# Patient Record
Sex: FEMALE | Race: WHITE | NOT HISPANIC OR LATINO | URBAN - METROPOLITAN AREA
[De-identification: names, ages, dates, MRNs, and addresses within clinical notes are randomized per-mention and may not be internally consistent; named-entity substitution may affect disease eponyms.]

---

## 2017-02-07 ENCOUNTER — APPOINTMENT (RX ONLY)
Dept: URBAN - METROPOLITAN AREA CLINIC 78 | Facility: CLINIC | Age: 62
Setting detail: DERMATOLOGY
End: 2017-02-07

## 2017-02-07 VITALS — HEIGHT: 63 IN | WEIGHT: 172 LBS

## 2017-02-07 DIAGNOSIS — L20.89 OTHER ATOPIC DERMATITIS: ICD-10-CM

## 2017-02-07 DIAGNOSIS — L82.1 OTHER SEBORRHEIC KERATOSIS: ICD-10-CM | Status: STABLE

## 2017-02-07 DIAGNOSIS — L82.0 INFLAMED SEBORRHEIC KERATOSIS: ICD-10-CM

## 2017-02-07 PROBLEM — F32.9 MAJOR DEPRESSIVE DISORDER, SINGLE EPISODE, UNSPECIFIED: Status: ACTIVE | Noted: 2017-02-07

## 2017-02-07 PROBLEM — F41.9 ANXIETY DISORDER, UNSPECIFIED: Status: ACTIVE | Noted: 2017-02-07

## 2017-02-07 PROBLEM — I48.91 UNSPECIFIED ATRIAL FIBRILLATION: Status: ACTIVE | Noted: 2017-02-07

## 2017-02-07 PROBLEM — J45.909 UNSPECIFIED ASTHMA, UNCOMPLICATED: Status: ACTIVE | Noted: 2017-02-07

## 2017-02-07 PROBLEM — M12.9 ARTHROPATHY, UNSPECIFIED: Status: ACTIVE | Noted: 2017-02-07

## 2017-02-07 PROBLEM — I10 ESSENTIAL (PRIMARY) HYPERTENSION: Status: ACTIVE | Noted: 2017-02-07

## 2017-02-07 PROCEDURE — ? OBSERVATION

## 2017-02-07 PROCEDURE — ? LIQUID NITROGEN

## 2017-02-07 PROCEDURE — 99213 OFFICE O/P EST LOW 20 MIN: CPT | Mod: 25

## 2017-02-07 PROCEDURE — ? PRESCRIPTION

## 2017-02-07 PROCEDURE — 17110 DESTRUCTION B9 LES UP TO 14: CPT

## 2017-02-07 PROCEDURE — ? TREATMENT REGIMEN

## 2017-02-07 RX ORDER — AMMONIUM LACTATE 17 G/G
CREAM TOPICAL
Qty: 1 | Refills: 4 | Status: ERX | COMMUNITY
Start: 2017-02-07

## 2017-02-07 RX ADMIN — AMMONIUM LACTATE: 17 CREAM TOPICAL at 15:54

## 2017-02-07 ASSESSMENT — LOCATION SIMPLE DESCRIPTION DERM
LOCATION SIMPLE: POSTERIOR SCALP
LOCATION SIMPLE: RIGHT PRETIBIAL REGION
LOCATION SIMPLE: LEFT POSTERIOR UPPER ARM
LOCATION SIMPLE: RIGHT CHEEK
LOCATION SIMPLE: LEFT PRETIBIAL REGION

## 2017-02-07 ASSESSMENT — LOCATION ZONE DERM
LOCATION ZONE: LEG
LOCATION ZONE: SCALP
LOCATION ZONE: FACE
LOCATION ZONE: ARM

## 2017-02-07 ASSESSMENT — LOCATION DETAILED DESCRIPTION DERM
LOCATION DETAILED: RIGHT DISTAL PRETIBIAL REGION
LOCATION DETAILED: LEFT DISTAL POSTERIOR UPPER ARM
LOCATION DETAILED: LEFT DISTAL PRETIBIAL REGION
LOCATION DETAILED: LEFT SUPERIOR OCCIPITAL SCALP
LOCATION DETAILED: RIGHT INFERIOR MEDIAL MALAR CHEEK

## 2017-02-07 NOTE — PROCEDURE: LIQUID NITROGEN
Detail Level: Detailed
Consent: The patient's consent was obtained including but not limited to risks of crusting, scabbing, blistering, scarring, darker or lighter pigmentary change, recurrence, incomplete removal and infection.
Medical Necessity Information: It is in your best interest to select a reason for this procedure from the list below. All of these items fulfill various CMS LCD requirements except the new and changing color options.
Duration Of Freeze Thaw-Cycle (Seconds): 0
Render Post-Care Instructions In Note?: no
Medical Necessity Clause: This procedure was medically necessary because the lesions that were treated were:
Post-Care Instructions: I reviewed with the patient in detail post-care instructions. Patient is to wear sunprotection, and avoid picking at any of the treated lesions. Pt may apply Vaseline to crusted or scabbing areas.

## 2018-03-28 ENCOUNTER — RX ONLY (OUTPATIENT)
Age: 63
Setting detail: RX ONLY
End: 2018-03-28

## 2018-03-28 RX ORDER — AMMONIUM LACTATE 17 G/G
CREAM TOPICAL
Qty: 1 | Refills: 4 | Status: ERX

## 2019-04-16 ENCOUNTER — EMERGENCY (EMERGENCY)
Facility: HOSPITAL | Age: 64
LOS: 1 days | Discharge: HOME | End: 2019-04-16
Attending: EMERGENCY MEDICINE
Payer: COMMERCIAL

## 2019-04-16 VITALS
HEART RATE: 80 BPM | HEIGHT: 63 IN | OXYGEN SATURATION: 96 % | TEMPERATURE: 98 F | DIASTOLIC BLOOD PRESSURE: 75 MMHG | WEIGHT: 175.93 LBS | RESPIRATION RATE: 18 BRPM | SYSTOLIC BLOOD PRESSURE: 116 MMHG

## 2019-04-16 PROCEDURE — 29515 APPLICATION SHORT LEG SPLINT: CPT

## 2019-04-16 PROCEDURE — 99284 EMERGENCY DEPT VISIT MOD MDM: CPT | Mod: 25

## 2019-04-16 PROCEDURE — 73610 X-RAY EXAM OF ANKLE: CPT | Mod: 26,RT

## 2019-04-16 PROCEDURE — 73610 X-RAY EXAM OF ANKLE: CPT

## 2019-04-16 PROCEDURE — 29515 APPLICATION SHORT LEG SPLINT: CPT | Mod: RT

## 2019-04-16 PROCEDURE — 99283 EMERGENCY DEPT VISIT LOW MDM: CPT | Mod: 25

## 2019-04-16 RX ORDER — NICOTINE POLACRILEX 2 MG
1 GUM BUCCAL DAILY
Qty: 0 | Refills: 0 | Status: DISCONTINUED | OUTPATIENT
Start: 2019-04-16 | End: 2019-04-21

## 2019-04-16 RX ORDER — ACETAMINOPHEN 500 MG
650 TABLET ORAL ONCE
Qty: 0 | Refills: 0 | Status: COMPLETED | OUTPATIENT
Start: 2019-04-16 | End: 2019-04-16

## 2019-04-16 RX ADMIN — Medication 1 PATCH: at 05:30

## 2019-04-16 RX ADMIN — Medication 650 MILLIGRAM(S): at 03:59

## 2019-04-16 NOTE — ED PROVIDER NOTE - PMH
Hypertension, unspecified type Hyperlipidemia, unspecified hyperlipidemia type    Hypertension, unspecified type    Type 2 diabetes mellitus with complication, unspecified whether long term insulin use

## 2019-04-16 NOTE — ED PROVIDER NOTE - PROGRESS NOTE DETAILS
RICHARD Hazel: Per Dr. Bianchi, pt safe for discharge. Advised pt to not go to scheduled meetings. Pt will be wheeled to exit and await Uber. May take OTC Tylenol. Requires outpatient orthopedic appointment. Patient/guardian educated on return precautions. Patient/guardian provided ample opportunity to ask questions which were answered to the fullest extent. Patient/guardian verbalized understanding and agreement of plan.

## 2019-04-16 NOTE — ED ADULT TRIAGE NOTE - CHIEF COMPLAINT QUOTE
pt s/p fall slipped on water, pt with right ankle pain noted with swelling positive pulses able to move toes. unable to bare weight. pt s/p fall slipped on water, pt with right ankle pain noted with swelling positive pulses able to move toes. unable to bare weight. denies LOC or head trauma.

## 2019-04-16 NOTE — ED PROVIDER NOTE - OBJECTIVE STATEMENT
62 yo female PMHx HTN, DM, HDL BIBA c/o ankle pain s/p fall. Pt is here on business from [place] and staying in hotel. States got up to use the bathroom, but sometimes does not make it in time to the bathroom, dribbling urine on floor which she subsequently slipped on. Pt inverted right ankle, hearing a "crack," landing on buttock. Unable to bare weight after injury. Pain described as 7/10 at rest. Did not self medicate PTA. No further complaints at this time.    Denies head trauma, headache, LOC.

## 2019-04-16 NOTE — ED PROVIDER NOTE - CLINICAL SUMMARY MEDICAL DECISION MAKING FREE TEXT BOX
62 yo female BIBA c/o ankle pain s/p fall. +Ecchymosis, swelling, TTP on exam.  Plan:  1. Ankle pain  - XR to eval for fracture  - Pain control  - Reassess

## 2019-04-16 NOTE — ED PROVIDER NOTE - ATTENDING CONTRIBUTION TO CARE
I personally saw the patient with the PA, and completed the key components of the history and physical exam. I then discussed the management plan with the PA.   gen in nad resp clear cardaic no murmur abd soft nt msk + ttp right lat mall nml distal pulses no sensory deficits   splinted nicci f.stanley neurovasc intact